# Patient Record
Sex: MALE | Race: WHITE
[De-identification: names, ages, dates, MRNs, and addresses within clinical notes are randomized per-mention and may not be internally consistent; named-entity substitution may affect disease eponyms.]

---

## 2018-05-27 ENCOUNTER — HOSPITAL ENCOUNTER (EMERGENCY)
Dept: HOSPITAL 17 - NEPC | Age: 43
Discharge: HOME | End: 2018-05-27
Payer: MEDICAID

## 2018-05-27 VITALS — WEIGHT: 308.65 LBS | HEIGHT: 72 IN | BODY MASS INDEX: 41.8 KG/M2

## 2018-05-27 VITALS
TEMPERATURE: 97.6 F | HEART RATE: 85 BPM | RESPIRATION RATE: 18 BRPM | DIASTOLIC BLOOD PRESSURE: 94 MMHG | SYSTOLIC BLOOD PRESSURE: 130 MMHG | OXYGEN SATURATION: 98 %

## 2018-05-27 DIAGNOSIS — A08.4: Primary | ICD-10-CM

## 2018-05-27 LAB
ALBUMIN SERPL-MCNC: 3.7 GM/DL (ref 3.4–5)
ALP SERPL-CCNC: 92 U/L (ref 45–117)
ALT SERPL-CCNC: 117 U/L (ref 12–78)
AST SERPL-CCNC: 42 U/L (ref 15–37)
BASOPHILS # BLD AUTO: 0.1 TH/MM3 (ref 0–0.2)
BASOPHILS NFR BLD: 0.8 % (ref 0–2)
BILIRUB SERPL-MCNC: 1.2 MG/DL (ref 0.2–1)
BUN SERPL-MCNC: 14 MG/DL (ref 7–18)
CALCIUM SERPL-MCNC: 8.9 MG/DL (ref 8.5–10.1)
CHLORIDE SERPL-SCNC: 107 MEQ/L (ref 98–107)
CREAT SERPL-MCNC: 1.4 MG/DL (ref 0.6–1.3)
EOSINOPHIL # BLD: 0.2 TH/MM3 (ref 0–0.4)
EOSINOPHIL NFR BLD: 1.9 % (ref 0–4)
ERYTHROCYTE [DISTWIDTH] IN BLOOD BY AUTOMATED COUNT: 13.1 % (ref 11.6–17.2)
GFR SERPLBLD BASED ON 1.73 SQ M-ARVRAT: 55 ML/MIN (ref 89–?)
GLUCOSE SERPL-MCNC: 100 MG/DL (ref 74–106)
HCO3 BLD-SCNC: 26.6 MEQ/L (ref 21–32)
HCT VFR BLD CALC: 48.7 % (ref 39–51)
HGB BLD-MCNC: 16.8 GM/DL (ref 13–17)
LYMPHOCYTES # BLD AUTO: 2.8 TH/MM3 (ref 1–4.8)
LYMPHOCYTES NFR BLD AUTO: 35.3 % (ref 9–44)
MCH RBC QN AUTO: 29.9 PG (ref 27–34)
MCHC RBC AUTO-ENTMCNC: 34.6 % (ref 32–36)
MCV RBC AUTO: 86.5 FL (ref 80–100)
MONOCYTE #: 0.8 TH/MM3 (ref 0–0.9)
MONOCYTES NFR BLD: 10.4 % (ref 0–8)
NEUTROPHILS # BLD AUTO: 4.1 TH/MM3 (ref 1.8–7.7)
NEUTROPHILS NFR BLD AUTO: 51.6 % (ref 16–70)
PLATELET # BLD: 215 TH/MM3 (ref 150–450)
PMV BLD AUTO: 7.8 FL (ref 7–11)
PROT SERPL-MCNC: 7.3 GM/DL (ref 6.4–8.2)
RBC # BLD AUTO: 5.63 MIL/MM3 (ref 4.5–5.9)
SODIUM SERPL-SCNC: 141 MEQ/L (ref 136–145)
WBC # BLD AUTO: 8 TH/MM3 (ref 4–11)

## 2018-05-27 PROCEDURE — 85025 COMPLETE CBC W/AUTO DIFF WBC: CPT

## 2018-05-27 PROCEDURE — 80053 COMPREHEN METABOLIC PANEL: CPT

## 2018-05-27 PROCEDURE — 99283 EMERGENCY DEPT VISIT LOW MDM: CPT

## 2018-05-27 PROCEDURE — 87015 SPECIMEN INFECT AGNT CONCNTJ: CPT

## 2018-05-27 NOTE — PD
HPI


Chief Complaint:  GI Complaint


Time Seen by Provider:  05:26


Travel History


International Travel<30 days:  No


Contact w/Intl Traveler<30days:  No


Traveled to known affect area:  No





History of Present Illness


HPI


Patient is a 43-year-old male who has had diarrhea and vomiting intermittently 

for the last 5-1/2 days.


He thinks that he got food poisoning from [employer] or [employer].


He ate at [employer] on Monday, and [employer] on Tuesday.


The timing for food poisoning is not consistent with the patient's history.


He states he has not eaten or drank anything since Tuesday, however he 

contradicts his history and states that he ate a hamburger on Thursday that he 

cooked at home.


He states that he is unable to tolerate liquids but then reports that every 

time he drinks something he ends up on the toilet.


There are 2 sick family members at home with the same symptoms.


They both developed their symptoms at different times.


The patient is a difficult historian however it sounds like he and his family 

are being affected by a viral entity.





PFSH


Past Medical History


Bipolar Disorder:  Yes


Depression:  Yes


Diabetes:  No


Diminished Hearing:  No


Gastrointestinal Disorders:  Yes (IBS)


Immunizations Current:  Yes


Tetanus Vaccination:  Unknown


Influenza Vaccination:  No





Past Surgical History


Abdominal Surgery:  Yes (HEMMOROID SURG)


Eye Surgery:  Yes (bilateral cornea transplants)


Genitourinary Surgery:  Yes (HEMMOROIDECTOMY)


Other Surgery:  Yes (hemrrhoidectomy)





Social History


Alcohol Use:  No


Tobacco Use:  No


Substance Use:  No





Allergies-Medications


(Allergen,Severity, Reaction):  


Coded Allergies:  


     No Known Allergies (Verified  Adverse Reaction, Unknown, 5/27/18)


Reported Meds & Prescriptions





Reported Meds & Active Scripts


Active


Sulfamethoxazole-Trimethoprim 800-160 Mg Tab 1 Tab PO BID


Pyridium (Phenazopyridine HCl) 100 Mg Tab 100 Mg PO Q8HR








Review of Systems


Except as stated in HPI:  all other systems reviewed are Neg


Gastrointestinal:  Positive: Vomiting, Diarrhea, No: Abdominal Pain





Physical Exam


Narrative


GENERAL: 43-year-old male in no distress


SKIN: Focused skin assessment warm/dry.


HEAD: Atraumatic. Normocephalic. 


EYES: Pupils equal and round. No scleral icterus. No injection or drainage. 


ENT: No nasal bleeding or discharge.  Mucous membranes pink and moist.


NECK: Trachea midline


CARDIOVASCULAR: Regular rate and rhythm.  No murmur appreciated.


RESPIRATORY: No accessory muscle use. Clear to auscultation. Breath sounds 

equal bilaterally. 


GASTROINTESTINAL: Abdomen soft, non-tender, nondistended.  Normal bowel sounds 

throughout. 


MUSCULOSKELETAL: No obvious deformities. No clubbing.  No cyanosis.  No edema.





Data


Data


Last Documented VS





Vital Signs








  Date Time  Temp Pulse Resp B/P (MAP) Pulse Ox O2 Delivery O2 Flow Rate FiO2


 


5/27/18 02:29 97.6 85 18 130/94 (106) 98   








Orders





 Orders


Complete Blood Count With Diff (5/27/18 05:35)


Comprehensive Metabolic Panel (5/27/18 05:35)


Stool Wbc (Leukocytes) (5/27/18 05:35)


Stool Afb Culture And Stain (5/27/18 05:35)


Stool Ova And Parasite Screen (5/27/18 05:35)





Labs





Laboratory Tests








Test


  5/27/18


05:50


 


White Blood Count 8.0 TH/MM3 


 


Red Blood Count 5.63 MIL/MM3 


 


Hemoglobin 16.8 GM/DL 


 


Hematocrit 48.7 % 


 


Mean Corpuscular Volume 86.5 FL 


 


Mean Corpuscular Hemoglobin 29.9 PG 


 


Mean Corpuscular Hemoglobin


Concent 34.6 % 


 


 


Red Cell Distribution Width 13.1 % 


 


Platelet Count 215 TH/MM3 


 


Mean Platelet Volume 7.8 FL 


 


Neutrophils (%) (Auto) 51.6 % 


 


Lymphocytes (%) (Auto) 35.3 % 


 


Monocytes (%) (Auto) 10.4 % 


 


Eosinophils (%) (Auto) 1.9 % 


 


Basophils (%) (Auto) 0.8 % 


 


Neutrophils # (Auto) 4.1 TH/MM3 


 


Lymphocytes # (Auto) 2.8 TH/MM3 


 


Monocytes # (Auto) 0.8 TH/MM3 


 


Eosinophils # (Auto) 0.2 TH/MM3 


 


Basophils # (Auto) 0.1 TH/MM3 


 


CBC Comment DIFF FINAL 


 


Differential Comment  


 


Blood Urea Nitrogen 14 MG/DL 


 


Creatinine 1.40 MG/DL 


 


Random Glucose 100 MG/DL 


 


Total Protein 7.3 GM/DL 


 


Albumin 3.7 GM/DL 


 


Calcium Level 8.9 MG/DL 


 


Alkaline Phosphatase 92 U/L 


 


Aspartate Amino Transf


(AST/SGOT) 42 U/L 


 


 


Alanine Aminotransferase


(ALT/SGPT) 117 U/L 


 


 


Total Bilirubin 1.2 MG/DL 


 


Sodium Level 141 MEQ/L 


 


Potassium Level 3.7 MEQ/L 


 


Chloride Level 107 MEQ/L 


 


Carbon Dioxide Level 26.6 MEQ/L 


 


Anion Gap 7 MEQ/L 


 


Estimat Glomerular Filtration


Rate 55 ML/MIN 


 











MDM


Medical Decision Making


Medical Screen Exam Complete:  Yes


Emergency Medical Condition:  Yes


Differential Diagnosis


Viral enteritis, viral gastroenteritis


Narrative Course


Patient was seen and evaluated in the emergency department.  A stool sample was 

requested but the patient was unable to provide one.  


His labs are not consistent with a 5 day history of vomiting/diarrhea and no 

p.o. intake


Patient is advised to use over-the-counter antidiarrheals if his diarrhea 

continues and to follow-up with his primary care physician





Diagnosis





 Primary Impression:  


 Viral enteritis


Patient Instructions:  Acute Diarrhea (GEN), Acute Nausea and Vomiting (ED), 

General Instructions


Disposition:  01 DISCHARGE HOME


Condition:  Good











TAMMIE Barragan DO May 27, 2018 06:26